# Patient Record
Sex: MALE | Race: WHITE
[De-identification: names, ages, dates, MRNs, and addresses within clinical notes are randomized per-mention and may not be internally consistent; named-entity substitution may affect disease eponyms.]

---

## 2022-05-10 LAB
BASOPHILS NFR BLD AUTO: 0.8 % (ref 0–5)
BUN SERPL-MCNC: 20 MG/DL (ref 7–18)
CHLORIDE SERPL-SCNC: 101 MMOL/L (ref 101–111)
CO2 SERPL-SCNC: 33 MMOL/L (ref 21–32)
CREAT SERPL-MCNC: 1 MG/DL (ref 0.5–1.5)
EOSINOPHIL NFR BLD AUTO: 4.3 % (ref 0–8)
ERYTHROCYTE [DISTWIDTH] IN BLOOD BY AUTOMATED COUNT: 13.9 % (ref 11–15.5)
GFR SERPL CREATININE-BSD FRML MDRD: 79 ML/MIN (ref 60–?)
GLUCOSE SERPL-MCNC: 102 MG/DL (ref 70–105)
HCT VFR BLD AUTO: 47.8 % (ref 42–54)
LYMPHOCYTES NFR SPEC AUTO: 16.2 % (ref 21–51)
MCH RBC QN AUTO: 28.2 PG (ref 27–33)
MCHC RBC AUTO-ENTMCNC: 31.6 G/DL (ref 32–36)
MCV RBC AUTO: 89.3 FL (ref 79–99)
MONOCYTES NFR BLD AUTO: 8.9 % (ref 3–13)
NEUTROPHILS NFR BLD AUTO: 69.4 % (ref 40–77)
NRBC BLD MANUAL-RTO: 0 % (ref 0–0.19)
PLATELET # BLD AUTO: 261 K/UL (ref 130–400)
POTASSIUM SERPL-SCNC: 5.3 MMOL/L (ref 3.5–5.1)
RBC # BLD AUTO: 5.35 MIL/UL (ref 4.5–6.2)
SODIUM SERPL-SCNC: 140 MMOL/L (ref 136–145)
WBC # BLD AUTO: 9.8 K/UL (ref 4.8–10.8)

## 2022-05-11 VITALS — DIASTOLIC BLOOD PRESSURE: 73 MMHG | SYSTOLIC BLOOD PRESSURE: 124 MMHG

## 2022-05-12 ENCOUNTER — HOSPITAL ENCOUNTER (OUTPATIENT)
Dept: HOSPITAL 90 - DAH | Age: 67
Discharge: HOME | End: 2022-05-12
Attending: ORTHOPAEDIC SURGERY
Payer: MEDICARE

## 2022-05-12 VITALS — DIASTOLIC BLOOD PRESSURE: 55 MMHG | SYSTOLIC BLOOD PRESSURE: 111 MMHG

## 2022-05-12 VITALS — DIASTOLIC BLOOD PRESSURE: 58 MMHG | SYSTOLIC BLOOD PRESSURE: 109 MMHG

## 2022-05-12 VITALS — WEIGHT: 202.6 LBS | HEIGHT: 72 IN | BODY MASS INDEX: 27.44 KG/M2

## 2022-05-12 VITALS — DIASTOLIC BLOOD PRESSURE: 60 MMHG | SYSTOLIC BLOOD PRESSURE: 129 MMHG

## 2022-05-12 VITALS — DIASTOLIC BLOOD PRESSURE: 68 MMHG | SYSTOLIC BLOOD PRESSURE: 127 MMHG

## 2022-05-12 VITALS — DIASTOLIC BLOOD PRESSURE: 62 MMHG | SYSTOLIC BLOOD PRESSURE: 118 MMHG

## 2022-05-12 VITALS — SYSTOLIC BLOOD PRESSURE: 112 MMHG | DIASTOLIC BLOOD PRESSURE: 57 MMHG

## 2022-05-12 VITALS — SYSTOLIC BLOOD PRESSURE: 136 MMHG | DIASTOLIC BLOOD PRESSURE: 62 MMHG

## 2022-05-12 VITALS — SYSTOLIC BLOOD PRESSURE: 122 MMHG | DIASTOLIC BLOOD PRESSURE: 63 MMHG

## 2022-05-12 VITALS — SYSTOLIC BLOOD PRESSURE: 124 MMHG | DIASTOLIC BLOOD PRESSURE: 67 MMHG

## 2022-05-12 VITALS — DIASTOLIC BLOOD PRESSURE: 69 MMHG | SYSTOLIC BLOOD PRESSURE: 128 MMHG

## 2022-05-12 VITALS — DIASTOLIC BLOOD PRESSURE: 62 MMHG | SYSTOLIC BLOOD PRESSURE: 120 MMHG

## 2022-05-12 VITALS — DIASTOLIC BLOOD PRESSURE: 81 MMHG | SYSTOLIC BLOOD PRESSURE: 136 MMHG

## 2022-05-12 VITALS — DIASTOLIC BLOOD PRESSURE: 64 MMHG | SYSTOLIC BLOOD PRESSURE: 118 MMHG

## 2022-05-12 DIAGNOSIS — M16.12: Primary | ICD-10-CM

## 2022-05-12 DIAGNOSIS — Z79.899: ICD-10-CM

## 2022-05-12 DIAGNOSIS — Z20.822: ICD-10-CM

## 2022-05-12 DIAGNOSIS — G89.29: ICD-10-CM

## 2022-05-12 PROCEDURE — 20610 DRAIN/INJ JOINT/BURSA W/O US: CPT

## 2022-05-12 PROCEDURE — 36415 COLL VENOUS BLD VENIPUNCTURE: CPT

## 2022-05-12 PROCEDURE — 87635 SARS-COV-2 COVID-19 AMP PRB: CPT

## 2022-05-12 PROCEDURE — 77002 NEEDLE LOCALIZATION BY XRAY: CPT

## 2022-05-12 PROCEDURE — 85025 COMPLETE CBC W/AUTO DIFF WBC: CPT

## 2022-05-12 PROCEDURE — 80048 BASIC METABOLIC PNL TOTAL CA: CPT

## 2022-05-12 PROCEDURE — 73503 X-RAY EXAM HIP UNI 4/> VIEWS: CPT

## 2022-05-12 RX ADMIN — SODIUM CHLORIDE SCH GM: 9 INJECTION, SOLUTION INTRAVENOUS at 08:50

## 2022-05-12 RX ADMIN — SODIUM CHLORIDE SCH GM: 9 INJECTION, SOLUTION INTRAVENOUS at 06:00

## 2022-08-18 VITALS — SYSTOLIC BLOOD PRESSURE: 140 MMHG | DIASTOLIC BLOOD PRESSURE: 75 MMHG

## 2022-08-18 LAB
ALBUMIN SERPL-MCNC: 4 G/DL (ref 3.5–5)
BASOPHILS NFR BLD AUTO: 1.5 % (ref 0–5)
BUN SERPL-MCNC: 19 MG/DL (ref 7–18)
CHLORIDE SERPL-SCNC: 100 MMOL/L (ref 101–111)
CO2 SERPL-SCNC: 31 MMOL/L (ref 21–32)
CREAT SERPL-MCNC: 1 MG/DL (ref 0.5–1.5)
CRP SERPL HS-MCNC: < 2 MG/L (ref 0–9)
EOSINOPHIL NFR BLD AUTO: 7.1 % (ref 0–8)
ERYTHROCYTE [DISTWIDTH] IN BLOOD BY AUTOMATED COUNT: 13.9 % (ref 11–15.5)
GFR SERPL CREATININE-BSD FRML MDRD: 79 ML/MIN (ref 60–?)
GLUCOSE SERPL-MCNC: 86 MG/DL (ref 70–105)
HCT VFR BLD AUTO: 44.4 % (ref 42–54)
LYMPHOCYTES NFR SPEC AUTO: 28.8 % (ref 21–51)
MCH RBC QN AUTO: 28.8 PG (ref 27–33)
MCHC RBC AUTO-ENTMCNC: 32.2 G/DL (ref 32–36)
MCV RBC AUTO: 89.5 FL (ref 79–99)
MONOCYTES NFR BLD AUTO: 10.5 % (ref 3–13)
NEUTROPHILS NFR BLD AUTO: 51.7 % (ref 40–77)
NRBC BLD MANUAL-RTO: 0 % (ref 0–0.19)
PLATELET # BLD AUTO: 253 K/UL (ref 130–400)
POTASSIUM SERPL-SCNC: 4.3 MMOL/L (ref 3.5–5.1)
RBC # BLD AUTO: 4.96 MIL/UL (ref 4.5–6.2)
SODIUM SERPL-SCNC: 136 MMOL/L (ref 136–145)
WBC # BLD AUTO: 5.4 K/UL (ref 4.8–10.8)

## 2022-08-19 ENCOUNTER — HOSPITAL ENCOUNTER (OUTPATIENT)
Dept: HOSPITAL 90 - DAH | Age: 67
End: 2022-08-19
Attending: ORTHOPAEDIC SURGERY
Payer: MEDICARE

## 2022-08-19 VITALS — DIASTOLIC BLOOD PRESSURE: 72 MMHG | SYSTOLIC BLOOD PRESSURE: 127 MMHG

## 2022-08-19 VITALS — SYSTOLIC BLOOD PRESSURE: 138 MMHG | DIASTOLIC BLOOD PRESSURE: 83 MMHG

## 2022-08-19 VITALS — DIASTOLIC BLOOD PRESSURE: 77 MMHG | SYSTOLIC BLOOD PRESSURE: 141 MMHG

## 2022-08-19 VITALS — DIASTOLIC BLOOD PRESSURE: 81 MMHG | SYSTOLIC BLOOD PRESSURE: 129 MMHG

## 2022-08-19 VITALS — WEIGHT: 207 LBS | BODY MASS INDEX: 28.04 KG/M2 | HEIGHT: 72 IN

## 2022-08-19 VITALS — DIASTOLIC BLOOD PRESSURE: 77 MMHG | SYSTOLIC BLOOD PRESSURE: 136 MMHG

## 2022-08-19 VITALS — SYSTOLIC BLOOD PRESSURE: 133 MMHG | DIASTOLIC BLOOD PRESSURE: 62 MMHG

## 2022-08-19 VITALS — SYSTOLIC BLOOD PRESSURE: 127 MMHG | DIASTOLIC BLOOD PRESSURE: 74 MMHG

## 2022-08-19 VITALS — DIASTOLIC BLOOD PRESSURE: 83 MMHG | SYSTOLIC BLOOD PRESSURE: 137 MMHG

## 2022-08-19 VITALS — SYSTOLIC BLOOD PRESSURE: 128 MMHG | DIASTOLIC BLOOD PRESSURE: 88 MMHG

## 2022-08-19 VITALS — DIASTOLIC BLOOD PRESSURE: 83 MMHG | SYSTOLIC BLOOD PRESSURE: 134 MMHG

## 2022-08-19 DIAGNOSIS — M16.12: Primary | ICD-10-CM

## 2022-08-19 DIAGNOSIS — Z20.822: ICD-10-CM

## 2022-08-19 DIAGNOSIS — G89.29: ICD-10-CM

## 2022-08-19 DIAGNOSIS — Z79.899: ICD-10-CM

## 2022-08-19 PROCEDURE — 87426 SARSCOV CORONAVIRUS AG IA: CPT

## 2022-08-19 PROCEDURE — 86140 C-REACTIVE PROTEIN: CPT

## 2022-08-19 PROCEDURE — 93005 ELECTROCARDIOGRAM TRACING: CPT

## 2022-08-19 PROCEDURE — 84134 ASSAY OF PREALBUMIN: CPT

## 2022-08-19 PROCEDURE — 36415 COLL VENOUS BLD VENIPUNCTURE: CPT

## 2022-08-19 PROCEDURE — 77002 NEEDLE LOCALIZATION BY XRAY: CPT

## 2022-08-19 PROCEDURE — 80048 BASIC METABOLIC PNL TOTAL CA: CPT

## 2022-08-19 PROCEDURE — 20610 DRAIN/INJ JOINT/BURSA W/O US: CPT

## 2022-08-19 PROCEDURE — 85025 COMPLETE CBC W/AUTO DIFF WBC: CPT

## 2022-08-19 PROCEDURE — 82040 ASSAY OF SERUM ALBUMIN: CPT

## 2022-08-19 PROCEDURE — 73503 X-RAY EXAM HIP UNI 4/> VIEWS: CPT

## 2025-01-02 ENCOUNTER — HOSPITAL ENCOUNTER (OUTPATIENT)
Dept: HOSPITAL 90 - DAHIP | Age: 70
Setting detail: OBSERVATION
LOS: 7 days | Discharge: HOME HEALTH | End: 2025-01-09
Attending: ORTHOPAEDIC SURGERY | Admitting: ORTHOPAEDIC SURGERY
Payer: MEDICARE

## 2025-01-02 VITALS — BODY MASS INDEX: 27.63 KG/M2 | WEIGHT: 204 LBS | HEIGHT: 72 IN

## 2025-01-02 VITALS
SYSTOLIC BLOOD PRESSURE: 164 MMHG | RESPIRATION RATE: 16 BRPM | TEMPERATURE: 97.2 F | HEART RATE: 60 BPM | DIASTOLIC BLOOD PRESSURE: 81 MMHG

## 2025-01-02 DIAGNOSIS — I10: ICD-10-CM

## 2025-01-02 DIAGNOSIS — Z79.899: ICD-10-CM

## 2025-01-02 DIAGNOSIS — D62: ICD-10-CM

## 2025-01-02 DIAGNOSIS — G89.18: ICD-10-CM

## 2025-01-02 DIAGNOSIS — M16.12: Primary | ICD-10-CM

## 2025-01-02 LAB
APTT PPP: 28.4 SEC (ref 26.3–35.5)
BASOPHILS # BLD AUTO: 0.09 K/UL (ref 0–0.2)
BASOPHILS NFR BLD AUTO: 1.5 % (ref 0–5)
BUN SERPL-MCNC: 11 MG/DL (ref 7–18)
CHLORIDE SERPL-SCNC: 104 MMOL/L (ref 101–111)
CO2 SERPL-SCNC: 33 MMOL/L (ref 21–32)
CREAT SERPL-MCNC: 1 MG/DL (ref 0.5–1.3)
EOSINOPHIL # BLD AUTO: 0.33 K/UL (ref 0–0.7)
EOSINOPHIL NFR BLD AUTO: 5.7 % (ref 0–8)
ERYTHROCYTE [DISTWIDTH] IN BLOOD BY AUTOMATED COUNT: 13.5 % (ref 11–15.5)
GFR SERPL CREATININE-BSD FRML MDRD: 81 ML/MIN (ref 90–?)
GLUCOSE SERPL-MCNC: 91 MG/DL (ref 70–105)
HCT VFR BLD AUTO: 46.2 % (ref 42–54)
IMM GRANULOCYTES # BLD: 0.01 K/UL (ref 0–1)
INR PPP: <= 0.93 (ref 0.85–1.15)
LYMPHOCYTES # SPEC AUTO: 1.6 K/UL (ref 1–4.8)
LYMPHOCYTES NFR SPEC AUTO: 27 % (ref 21–51)
MCH RBC QN AUTO: 29.4 PG (ref 27–33)
MCHC RBC AUTO-ENTMCNC: 32 G/DL (ref 32–36)
MCV RBC AUTO: 91.7 FL (ref 79–99)
MONOCYTES # BLD AUTO: 0.5 K/UL (ref 0.1–1)
MONOCYTES NFR BLD AUTO: 9 % (ref 3–13)
NEUTROPHILS # BLD AUTO: 3.3 K/UL (ref 1.8–7.7)
NEUTROPHILS NFR BLD AUTO: 56.6 % (ref 40–77)
NRBC BLD MANUAL-RTO: 0 % (ref 0–0.19)
PLATELET # BLD AUTO: 241 K/UL (ref 130–400)
POTASSIUM SERPL-SCNC: 5.1 MMOL/L (ref 3.5–5.1)
PROTHROMBIN TIME: 10.3 SEC (ref 9.6–11.6)
RBC # BLD AUTO: 5.04 MIL/UL (ref 4.5–6.2)
SODIUM SERPL-SCNC: 143 MMOL/L (ref 136–145)
WBC # BLD AUTO: 5.8 K/UL (ref 4.8–10.8)

## 2025-01-02 PROCEDURE — A4216 STERILE WATER/SALINE, 10 ML: HCPCS

## 2025-01-02 PROCEDURE — G0378 HOSPITAL OBSERVATION PER HR: HCPCS

## 2025-01-02 PROCEDURE — 64447 NJX AA&/STRD FEMORAL NRV IMG: CPT

## 2025-01-02 PROCEDURE — 88304 TISSUE EXAM BY PATHOLOGIST: CPT

## 2025-01-02 PROCEDURE — 85610 PROTHROMBIN TIME: CPT

## 2025-01-02 PROCEDURE — 97161 PT EVAL LOW COMPLEX 20 MIN: CPT

## 2025-01-02 PROCEDURE — A5120 SKIN BARRIER, WIPE OR SWAB: HCPCS

## 2025-01-02 PROCEDURE — 73503 X-RAY EXAM HIP UNI 4/> VIEWS: CPT

## 2025-01-02 PROCEDURE — A9272 DISP WOUND SUCT, DRSG/ACCESS: HCPCS

## 2025-01-02 PROCEDURE — C1776 JOINT DEVICE (IMPLANTABLE): HCPCS

## 2025-01-02 PROCEDURE — 97530 THERAPEUTIC ACTIVITIES: CPT

## 2025-01-02 PROCEDURE — 85730 THROMBOPLASTIN TIME PARTIAL: CPT

## 2025-01-02 PROCEDURE — A4215 STERILE NEEDLE: HCPCS

## 2025-01-02 PROCEDURE — 85027 COMPLETE CBC AUTOMATED: CPT

## 2025-01-02 PROCEDURE — 27130 TOTAL HIP ARTHROPLASTY: CPT

## 2025-01-02 PROCEDURE — G0379 DIRECT REFER HOSPITAL OBSERV: HCPCS

## 2025-01-02 PROCEDURE — 96376 TX/PRO/DX INJ SAME DRUG ADON: CPT

## 2025-01-02 PROCEDURE — A4221 SUPP NON-INSULIN INF CATH/WK: HCPCS

## 2025-01-02 PROCEDURE — A4223 INFUSION SUPPLIES W/O PUMP: HCPCS

## 2025-01-02 PROCEDURE — 85025 COMPLETE CBC W/AUTO DIFF WBC: CPT

## 2025-01-02 PROCEDURE — 88311 DECALCIFY TISSUE: CPT

## 2025-01-02 PROCEDURE — A4663 DIALYSIS BLOOD PRESSURE CUFF: HCPCS

## 2025-01-02 PROCEDURE — 97116 GAIT TRAINING THERAPY: CPT

## 2025-01-02 PROCEDURE — 96374 THER/PROPH/DIAG INJ IV PUSH: CPT

## 2025-01-02 PROCEDURE — A4600 SLEEVE, INTER LIMB COMP DEV: HCPCS

## 2025-01-02 PROCEDURE — 80048 BASIC METABOLIC PNL TOTAL CA: CPT

## 2025-01-02 PROCEDURE — 36415 COLL VENOUS BLD VENIPUNCTURE: CPT

## 2025-01-02 PROCEDURE — A4222 INFUSION SUPPLIES WITH PUMP: HCPCS

## 2025-01-02 PROCEDURE — 87641 MR-STAPH DNA AMP PROBE: CPT

## 2025-01-02 PROCEDURE — A4649 SURGICAL SUPPLIES: HCPCS

## 2025-01-02 PROCEDURE — 96375 TX/PRO/DX INJ NEW DRUG ADDON: CPT

## 2025-01-07 VITALS
DIASTOLIC BLOOD PRESSURE: 80 MMHG | RESPIRATION RATE: 18 BRPM | HEART RATE: 82 BPM | SYSTOLIC BLOOD PRESSURE: 139 MMHG | TEMPERATURE: 97.3 F

## 2025-01-07 VITALS
DIASTOLIC BLOOD PRESSURE: 66 MMHG | RESPIRATION RATE: 16 BRPM | HEART RATE: 63 BPM | SYSTOLIC BLOOD PRESSURE: 124 MMHG | TEMPERATURE: 97.3 F

## 2025-01-07 VITALS
SYSTOLIC BLOOD PRESSURE: 123 MMHG | HEART RATE: 60 BPM | DIASTOLIC BLOOD PRESSURE: 65 MMHG | TEMPERATURE: 97.3 F | RESPIRATION RATE: 15 BRPM

## 2025-01-07 VITALS
DIASTOLIC BLOOD PRESSURE: 72 MMHG | TEMPERATURE: 97.3 F | HEART RATE: 60 BPM | RESPIRATION RATE: 15 BRPM | SYSTOLIC BLOOD PRESSURE: 131 MMHG

## 2025-01-07 VITALS
DIASTOLIC BLOOD PRESSURE: 76 MMHG | TEMPERATURE: 97.3 F | SYSTOLIC BLOOD PRESSURE: 130 MMHG | RESPIRATION RATE: 17 BRPM | HEART RATE: 83 BPM

## 2025-01-07 VITALS
TEMPERATURE: 97.3 F | SYSTOLIC BLOOD PRESSURE: 125 MMHG | HEART RATE: 63 BPM | RESPIRATION RATE: 15 BRPM | DIASTOLIC BLOOD PRESSURE: 62 MMHG

## 2025-01-07 VITALS
HEART RATE: 78 BPM | TEMPERATURE: 97.8 F | RESPIRATION RATE: 18 BRPM | SYSTOLIC BLOOD PRESSURE: 121 MMHG | DIASTOLIC BLOOD PRESSURE: 71 MMHG

## 2025-01-07 VITALS
DIASTOLIC BLOOD PRESSURE: 82 MMHG | RESPIRATION RATE: 17 BRPM | SYSTOLIC BLOOD PRESSURE: 128 MMHG | TEMPERATURE: 97.3 F | HEART RATE: 87 BPM

## 2025-01-07 VITALS
TEMPERATURE: 97.3 F | DIASTOLIC BLOOD PRESSURE: 69 MMHG | RESPIRATION RATE: 16 BRPM | HEART RATE: 69 BPM | SYSTOLIC BLOOD PRESSURE: 130 MMHG

## 2025-01-07 VITALS
DIASTOLIC BLOOD PRESSURE: 63 MMHG | HEART RATE: 62 BPM | TEMPERATURE: 97.3 F | RESPIRATION RATE: 15 BRPM | SYSTOLIC BLOOD PRESSURE: 125 MMHG

## 2025-01-07 VITALS — DIASTOLIC BLOOD PRESSURE: 74 MMHG | HEART RATE: 86 BPM | RESPIRATION RATE: 18 BRPM | SYSTOLIC BLOOD PRESSURE: 134 MMHG

## 2025-01-07 VITALS
SYSTOLIC BLOOD PRESSURE: 127 MMHG | RESPIRATION RATE: 18 BRPM | DIASTOLIC BLOOD PRESSURE: 67 MMHG | OXYGEN SATURATION: 95 % | HEART RATE: 83 BPM

## 2025-01-07 VITALS — RESPIRATION RATE: 18 BRPM | DIASTOLIC BLOOD PRESSURE: 67 MMHG | SYSTOLIC BLOOD PRESSURE: 128 MMHG | HEART RATE: 63 BPM

## 2025-01-07 VITALS — RESPIRATION RATE: 19 BRPM | DIASTOLIC BLOOD PRESSURE: 51 MMHG | SYSTOLIC BLOOD PRESSURE: 120 MMHG | HEART RATE: 82 BPM

## 2025-01-07 VITALS — RESPIRATION RATE: 19 BRPM | DIASTOLIC BLOOD PRESSURE: 61 MMHG | HEART RATE: 67 BPM | SYSTOLIC BLOOD PRESSURE: 125 MMHG

## 2025-01-07 VITALS
DIASTOLIC BLOOD PRESSURE: 67 MMHG | HEART RATE: 65 BPM | RESPIRATION RATE: 16 BRPM | SYSTOLIC BLOOD PRESSURE: 122 MMHG | TEMPERATURE: 97.3 F

## 2025-01-07 VITALS
SYSTOLIC BLOOD PRESSURE: 129 MMHG | DIASTOLIC BLOOD PRESSURE: 65 MMHG | RESPIRATION RATE: 19 BRPM | TEMPERATURE: 98.4 F | HEART RATE: 60 BPM

## 2025-01-07 VITALS
TEMPERATURE: 97.3 F | DIASTOLIC BLOOD PRESSURE: 63 MMHG | RESPIRATION RATE: 15 BRPM | HEART RATE: 61 BPM | SYSTOLIC BLOOD PRESSURE: 131 MMHG

## 2025-01-07 VITALS
HEART RATE: 79 BPM | TEMPERATURE: 97.3 F | SYSTOLIC BLOOD PRESSURE: 124 MMHG | DIASTOLIC BLOOD PRESSURE: 74 MMHG | RESPIRATION RATE: 15 BRPM

## 2025-01-07 VITALS — HEART RATE: 91 BPM | DIASTOLIC BLOOD PRESSURE: 70 MMHG | RESPIRATION RATE: 18 BRPM | SYSTOLIC BLOOD PRESSURE: 127 MMHG

## 2025-01-07 VITALS
HEART RATE: 62 BPM | SYSTOLIC BLOOD PRESSURE: 135 MMHG | TEMPERATURE: 96.4 F | DIASTOLIC BLOOD PRESSURE: 76 MMHG | RESPIRATION RATE: 18 BRPM

## 2025-01-07 VITALS
HEART RATE: 67 BPM | TEMPERATURE: 98 F | DIASTOLIC BLOOD PRESSURE: 58 MMHG | RESPIRATION RATE: 18 BRPM | SYSTOLIC BLOOD PRESSURE: 116 MMHG

## 2025-01-07 VITALS
RESPIRATION RATE: 17 BRPM | TEMPERATURE: 97.3 F | SYSTOLIC BLOOD PRESSURE: 129 MMHG | DIASTOLIC BLOOD PRESSURE: 75 MMHG | HEART RATE: 75 BPM

## 2025-01-07 VITALS — RESPIRATION RATE: 18 BRPM | DIASTOLIC BLOOD PRESSURE: 64 MMHG | SYSTOLIC BLOOD PRESSURE: 113 MMHG | HEART RATE: 74 BPM

## 2025-01-07 VITALS — RESPIRATION RATE: 18 BRPM | HEART RATE: 65 BPM | DIASTOLIC BLOOD PRESSURE: 37 MMHG | SYSTOLIC BLOOD PRESSURE: 71 MMHG

## 2025-01-07 RX ADMIN — TRANEXAMIC ACID ONE MG: 100 INJECTION, SOLUTION INTRAVENOUS at 16:36

## 2025-01-07 RX ADMIN — ASPIRIN TAB DELAYED RELEASE 81 MG SCH MG: 81 TABLET DELAYED RESPONSE at 20:21

## 2025-01-07 RX ADMIN — FAMOTIDINE SCH MG: 20 TABLET, FILM COATED ORAL at 20:21

## 2025-01-07 RX ADMIN — SODIUM CHLORIDE SCH MG: 9 INJECTION, SOLUTION INTRAVENOUS at 12:00

## 2025-01-07 RX ADMIN — MEPERIDINE HYDROCHLORIDE ONE MG: 25 INJECTION, SOLUTION INTRAMUSCULAR; INTRAVENOUS; SUBCUTANEOUS at 16:44

## 2025-01-07 RX ADMIN — Medication ONE MLS/HR: at 10:03

## 2025-01-07 RX ADMIN — HYDROCODONE BITARTRATE AND ACETAMINOPHEN PRN TAB: 5; 325 TABLET ORAL at 20:50

## 2025-01-07 RX ADMIN — TRANEXAMIC ACID ONE MG: 100 INJECTION, SOLUTION INTRAVENOUS at 12:45

## 2025-01-07 RX ADMIN — SODIUM CHLORIDE SCH MLS/HR: 0.9 INJECTION, SOLUTION INTRAVENOUS at 16:00

## 2025-01-07 NOTE — OP
Operative Note:


DATE OF PROCEDURE: 1/7/25 





SURGEON: DEEDEE CHILDRESS MD 





ASSISTANT: [Yana Herr CFA] 





ANESTHESIA:  [General anesthesia plus regional block]





ANESTHESIOLOGIST/CRNA:  [Micheal Clark CRNA]  





PREOPERATIVE DIAGNOSIS:  [Left hip osteoarthritis]





POSTOPERATIVE DIAGNOSIS:  [Left hip osteoarthritis]





IMPLANTS:  [BIOMET.  Femoral stem taper lock standard offset size 12 mm.  

Femoral head size 36 mm plus three.  Acetabulum G7 size 60 mm.  Acetabular liner

 36 mm high wall.]





PROCEDURE: [Left total hip replacement]





ESTIMATED BLOOD LOSS: [300 mL]





INDICATIONS: [The patient is a very healthy 69-year-old male with history of 

pain to the left hip secondary to osteoarthritis.  The patient is extremely 

active and wishes to proceed with a total hip arthroplasty after failing 

conservative treatment.  The patient is brought to the operating room for a left

 total hip arthroplasty, procedure that he understood as well as the risks 

involved, benefits and possible complications and agreed signed the consent 

form]








DESCRIPTION OF PROCEDURE: [After adequate general anesthesia was achieved and 

regional block obtained the patient was placed in the    lateral decubitus with 

the use of the beanbag and hip holders.  The left lower extremity was prepped 

and draped in the usual manner after x-rays taken with the C-arm were used to 

check the position of the pelvis.  After anatomic landmarks were identified we 

proceeded to make an incision in the skin centered on the greater trochanter and

 with a slight curve posteriorly followed by dissection of the subcutaneous 

tissue with the use of the Bovie cautery.  The tensor fascia anna and gluteus 

zachariah fascia were then opened longitudinally and the fibers of the gluteus 

muscle were split manually entering into the deep space applying then the 

Charnley retractor.  At this point we proceeded to apply a bone infusion needle 

into the greater trochanter area and we injected 500 mg of vancomycin in 50 mL 

of normal saline into the proximal femur.  The bone needle was then removed.  

The posterior border of the gluteus medius was identified and elevated and a 

curved Hohmann retractor was inserted underneath to be able to expose the 

gluteus minimus and short external rotators.  The interval between the 

piriformis and gluteus minimus was open with the use of the Bovie cautery and 

then the rotators and capsule were detached from their femoral neck insertion 

and retracted posteriorly entering into the hip joint.  At this point a marker 

was applied in the iliac bone just superior to the acetabulum with the use of 

drill guide to be able to check the offset and the length of the extremity at 

the level of the trochanter.  Once the marker was made this was passed to the 

back table.  We then proceeded to dislocate the hip by flexing the hip and then 

internally rotating it and after retractors were applied to the base of the 

femoral neck we proceeded to use the oscillating saw to cut the neck at this 

level.  We then proceeded to use a box osteotome to enter the canal followed by 

insertion of the canal finder and then we proceeded to broach from size 4 up to 

above-mentioned size.  At this point we removed the last broach and packed the 

canal for hemostasis and after the retractors were removed we proceeded then to 

bring the hip into extension.  Hohmann retractors were then applied anteriorly 

and posteriorly to the acetabulum removing then the labrum and foveal tissue.  

We proceeded  to ream the acetabulum medializing it obtaining adequate cortico-

cancellous bone.  After irrigation was of the acetabulum was completed we 

proceeded then to apply the final component and x-rays were taken correcting the

 orientation of the acetabulum to its final position.  Then a trial liner was 

inserted as well as a trial femur and then we proceeded to apply the trial 

femoral heads reducing the hip and taken x-rays until we identify the adequate 

size component using the marker to check the length and offset as well as 

clinically using the shuck test and measuring the leg length.  Once we were 

satisfied we proceeded to remove the components from the femur and the trial 

liner after dislocating the hip and after copious irrigation of the joint was 

completed we then proceeded to apply the final liner followed by the insertion 

of the final femoral stem femoral head.  Once the hip was reduced we used a 

marker once again and the leg length and offset were normal and clinically the 

patient had normal length, negative shuck test and x-rays reveal adequate leg 

length compared to the opposite hip.  The joint was then copiously irrigated 

with a diluted Betadine solution followed by irrigation with antibiotic solution

 with jet lavage once again and we then proceeded to take final x-rays and then 

to close the wound first with approximation of the capsule with #1 Vicryl simple

 stitches followed by approximation of the short  external rotators with #2 PDS 

suture passing the sutures through the bone.  The Charnley retractor was then 

removed and the gluteus zachariah fascia was closed with a #1 Vicryl running 

stitch and the tensor fascia anna with #1 Vicryl crossed stitches.  The 

subcutaneous tissue was closed with #2 Monocryl inverted stitches and the skin 

was closed with 3-0 Monocryl subcuticularly.  A suction dressing was then 

applied to the incision and the drapes were then removed the patient being 

placed in the supine position.  Leg length was checked and noted to be adequate.

  The patient was then transferred to a hospital bed and taken to recovery room 

for follow-up by anesthesia.  There were no complications during the procedure.]











DEEDEE CHILDRESS MD                 Jan 7, 2025 19:37

## 2025-01-07 NOTE — HMCIMG
HIP UNILAT 4VW LEFT



REASON:  ORIF LEFT THEODORE, SX.



COMPARISON: None 



TECHNIQUE: Fluoroscopic images of left hip were obtained. 



FINDINGS: Please see procedure report by referring physician.



IMPRESSION: 

  Intraoperative films.

## 2025-01-07 NOTE — NUR
PATIENT ADMITTED TO UNIT. AWAKE AND ALERT. NO S/S OF DISTRESS NOTED. SX DRESSING IN PLACE 
AND INTACT. SCD'S APPLIED AS ORDERED POST OP VITALS INITIATED. PATIENT EDUCATED ON I.S.

## 2025-01-08 VITALS
TEMPERATURE: 97.6 F | HEART RATE: 65 BPM | DIASTOLIC BLOOD PRESSURE: 65 MMHG | SYSTOLIC BLOOD PRESSURE: 118 MMHG | RESPIRATION RATE: 20 BRPM

## 2025-01-08 VITALS
SYSTOLIC BLOOD PRESSURE: 112 MMHG | HEART RATE: 69 BPM | DIASTOLIC BLOOD PRESSURE: 68 MMHG | TEMPERATURE: 98 F | RESPIRATION RATE: 18 BRPM

## 2025-01-08 VITALS
HEART RATE: 73 BPM | TEMPERATURE: 97.8 F | SYSTOLIC BLOOD PRESSURE: 124 MMHG | DIASTOLIC BLOOD PRESSURE: 64 MMHG | RESPIRATION RATE: 17 BRPM

## 2025-01-08 VITALS
DIASTOLIC BLOOD PRESSURE: 57 MMHG | SYSTOLIC BLOOD PRESSURE: 98 MMHG | TEMPERATURE: 97.9 F | HEART RATE: 65 BPM | RESPIRATION RATE: 20 BRPM

## 2025-01-08 VITALS
TEMPERATURE: 98.2 F | SYSTOLIC BLOOD PRESSURE: 127 MMHG | RESPIRATION RATE: 20 BRPM | HEART RATE: 66 BPM | DIASTOLIC BLOOD PRESSURE: 49 MMHG

## 2025-01-08 VITALS — OXYGEN SATURATION: 100 %

## 2025-01-08 LAB
BUN SERPL-MCNC: 12 MG/DL (ref 7–18)
CHLORIDE SERPL-SCNC: 104 MMOL/L (ref 101–111)
CO2 SERPL-SCNC: 30 MMOL/L (ref 21–32)
CREAT SERPL-MCNC: 1 MG/DL (ref 0.5–1.3)
ERYTHROCYTE [DISTWIDTH] IN BLOOD BY AUTOMATED COUNT: 13.7 % (ref 11–15.5)
GFR SERPL CREATININE-BSD FRML MDRD: 81 ML/MIN (ref 90–?)
GLUCOSE SERPL-MCNC: 132 MG/DL (ref 70–105)
HCT VFR BLD AUTO: 37.5 % (ref 42–54)
MCH RBC QN AUTO: 29.4 PG (ref 27–33)
MCHC RBC AUTO-ENTMCNC: 32.5 G/DL (ref 32–36)
MCV RBC AUTO: 90.4 FL (ref 79–99)
NRBC BLD MANUAL-RTO: 0 % (ref 0–0.19)
PLATELET # BLD AUTO: 221 K/UL (ref 130–400)
POTASSIUM SERPL-SCNC: 4.7 MMOL/L (ref 3.5–5.1)
RBC # BLD AUTO: 4.15 MIL/UL (ref 4.5–6.2)
SODIUM SERPL-SCNC: 139 MMOL/L (ref 136–145)
WBC # BLD AUTO: 11 K/UL (ref 4.8–10.8)

## 2025-01-08 NOTE — PN
Ortho postop day one.  


This morning patient is awake alert and oriented he is seated at the bedside in 

the chair resting comfortably reporting adequate pain control.  


Vital signs have remained stable.  He has been afebrile.  


Laboratory results reviewed.  Noted to have a drop in hemoglobin and hematocrit 

as expected after total hip arthroplasty.  Patient is asymptomatic and we will 

address per protocol as necessary.  


Voiding on his own without difficulty.  


Operative findings discussed with the patient.  


Instructed on an incentive spirometry and returned demonstration adequate.  


Dressing is intact.  No obvious leg-length discrepancy.  The gastrocnemius a 

soft nontender.  Distal neurovascular exam normal.  


Patient did not ambulate yesterday but is pending ambulation this morning.  


Patient is anticipating going home with a Home Health.  I have discussed with 

the patient we will depend on his progress with physical therapy today and he 

agrees.


Assessment: Status post left total hip arthroplasty.  


                  Asymptomatic acute postoperative blood loss anemia.  


Plan:  Continue with Dr. Israel was total hip arthroplasty protocol and discharge 

planning.  


         Asymptomatic acute postoperative blood loss anemia addressed with the 

protocol as necessary


Vitals/Labs





Vital Signs








  Date Time  Temp Pulse Resp B/P (MAP) Pulse Ox O2 Delivery O2 Flow Rate FiO2


 


1/8/25 08:00     100 Nasal Cannula* 2 28


 


1/8/25 07:45 97.9 65 20 98/57    





Laboratory Tests


1/8/25 04:36








Medications





Current Medications


Cefazolin Sodium 2 gm STK-MED ONCE .ROUTE Last administered on 1/7/25at 12:40;  

Start 1/7/25 at 09:43;  Stop 1/7/25 at 09:43;  Status DC


Lactated Ringer's 1,000 ml @  As Directed STK-MED ONCE IV Last administered on 

1/7/25at 10:03;  Start 1/7/25 at 09:43;  Stop 1/7/25 at 09:43;  Status DC


Vancomycin HCl 500 mg ONCALL IJ Last administered on 1/7/25at 13:35;  Start 

1/7/25 at 12:00;  Stop 1/7/25 at 16:00;  Status DC


Cefazolin Sodium 1 gm STK-MED ONCE .ROUTE Last administered on 1/7/25at 13:35;  

Start 1/7/25 at 11:48;  Stop 1/7/25 at 11:48;  Status DC


Tranexamic Acid 1,000 mg STK-MED ONCE .ROUTE Last administered on 1/7/25at 

12:45;  Start 1/7/25 at 11:49;  Stop 1/7/25 at 11:50;  Status DC


Lidocaine HCl 5 ml STK-MED ONCE IJ;  Start 1/7/25 at 12:11;  Stop 1/7/25 at 

12:12;  Status DC


Midazolam HCl 2 mg STK-MED ONCE .ROUTE;  Start 1/7/25 at 12:12;  Stop 1/7/25 at 

12:12;  Status DC


Propofol 200 mg STK-MED ONCE IV;  Start 1/7/25 at 12:12;  Stop 1/7/25 at 12:12; 

Status DC


Rocuronium Bromide 50 mg STK-MED ONCE .ROUTE;  Start 1/7/25 at 12:12;  Stop 

1/7/25 at 12:12;  Status DC


Fentanyl Citrate 100 mcg STK-MED ONCE .ROUTE;  Start 1/7/25 at 12:12;  Stop 

1/7/25 at 12:13;  Status DC


Dexamethasone Sodium Phosphate 4 mg STK-MED ONCE .ROUTE;  Start 1/7/25 at 12:14;

 Stop 1/7/25 at 12:14;  Status DC


Glycopyrrolate 1 mg STK-MED ONCE .ROUTE;  Start 1/7/25 at 12:14;  Stop 1/7/25 at

12:14;  Status DC


Neostigmine Methylsulfate 10 mg STK-MED ONCE IV;  Start 1/7/25 at 12:14;  Stop 

1/7/25 at 12:14;  Status DC


Phenylephrine HCl 10 mg STK-MED ONCE IV;  Start 1/7/25 at 12:14;  Stop 1/7/25 at

12:15;  Status DC


Ondansetron HCl 4 mg STK-MED ONCE .ROUTE;  Start 1/7/25 at 12:15;  Stop 1/7/25 

at 12:15;  Status DC


Ropivacaine 150 mg STK-MED ONCE .ROUTE;  Start 1/7/25 at 12:16;  Stop 1/7/25 at 

12:16;  Status DC


Ondansetron HCl 4 mg STK-MED ONCE .ROUTE;  Start 1/7/25 at 13:20;  Stop 1/7/25 

at 13:24;  Status DC


Ephedrine Sulfate 50 mg STK-MED ONCE .ROUTE;  Start 1/7/25 at 13:26;  Stop 

1/7/25 at 13:26;  Status DC


Fentanyl Citrate 100 mcg STK-MED ONCE .ROUTE;  Start 1/7/25 at 13:35;  Stop 

1/7/25 at 13:35;  Status DC


Sodium Chloride 1,000 ml @  100 mls/hr Q10H IV Last administered on 1/8/25at 

04:21;  Start 1/7/25 at 16:00;  Stop 1/8/25 at 15:59


Polyethylene Glycol 17 gm DAILY PO;  Start 1/8/25 at 09:00;  Stop 2/7/25 at 

08:59


Bisacodyl 10 mg DAILY  PRN RC;  Start 1/10/25 at 16:00;  Stop 2/9/25 at 15:59


Ketorolac Tromethamine 15 mg Q6H  PRN IV Last administered on 1/8/25at 08:41;  

Start 1/7/25 at 16:00;  Stop 1/12/25 at 15:59


Famotidine 20 mg BID PO Last administered on 1/8/25at 08:29;  Start 1/7/25 at 

21:00;  Stop 2/6/25 at 20:59


Ferrous Fumarate 324 mg DAILY  PRN PO;  Start 1/7/25 at 16:00;  Stop 2/6/25 at 

15:59


Temazepam 15 mg HS  PRN PO;  Start 1/7/25 at 16:00;  Stop 2/6/25 at 15:59


Calcium Carbonate 500 mg Q12H  PRN PO;  Start 1/7/25 at 16:00;  Stop 2/6/25 at 

15:59


Diphenhydramine HCl 25 mg Q6H  PRN IVP;  Start 1/7/25 at 16:00;  Stop 2/6/25 at 

15:59


Ondansetron HCl 4 mg Q6H  PRN IVP;  Start 1/7/25 at 16:00;  Stop 2/6/25 at 15:59


Cefazolin Sodium 2 gm Q8H IVP Last administered on 1/8/25at 05:53;  Start 1/7/25

at 21:00;  Stop 1/8/25 at 05:01;  Status DC


Potassium Chloride 100 ml @  100 mls/hr AD  PRN IV;  Start 1/7/25 at 16:00;  

Stop 2/6/25 at 15:59


Potassium Chloride 20 meq AD  PRN PO;  Start 1/7/25 at 16:00;  Stop 2/6/25 at 

15:59


Potassium Chloride 20 meq AD  PRN PO;  Start 1/7/25 at 16:00;  Stop 2/6/25 at 

15:59


Celecoxib 200 mg BID PO Last administered on 1/8/25at 08:29;  Start 1/7/25 at 

21:00;  Stop 2/6/25 at 20:59


Tramadol HCl 50 mg Q6H  PRN PO;  Start 1/7/25 at 16:00;  Stop 1/12/25 at 15:59


Acetaminophen/ Hydrocodone Bitart  Q4H  PRN PO Last administered on 1/8/25at 

05:54;  Start 1/7/25 at 16:00;  Stop 1/12/25 at 15:59


Aspirin 81 mg BID PO Last administered on 1/8/25at 08:29;  Start 1/7/25 at 

21:00;  Stop 2/6/25 at 20:59


Tranexamic Acid 1,000 mg STK-MED ONCE .ROUTE Last administered on 1/7/25at 

16:36;  Start 1/7/25 at 16:32;  Stop 1/7/25 at 16:32;  Status DC


Meperidine HCl 25 mg STK-MED ONCE .ROUTE Last administered on 1/7/25at 16:44;  

Start 1/7/25 at 16:41;  Stop 1/7/25 at 16:41;  Status DC











JAC ANDRADE NP           Jan 8, 2025 09:43

## 2025-01-09 VITALS
OXYGEN SATURATION: 98 % | HEART RATE: 69 BPM | DIASTOLIC BLOOD PRESSURE: 61 MMHG | SYSTOLIC BLOOD PRESSURE: 115 MMHG | RESPIRATION RATE: 18 BRPM | TEMPERATURE: 98.7 F

## 2025-01-09 VITALS
TEMPERATURE: 97.8 F | SYSTOLIC BLOOD PRESSURE: 119 MMHG | RESPIRATION RATE: 20 BRPM | HEART RATE: 62 BPM | DIASTOLIC BLOOD PRESSURE: 65 MMHG

## 2025-01-09 VITALS
TEMPERATURE: 98.4 F | DIASTOLIC BLOOD PRESSURE: 62 MMHG | HEART RATE: 74 BPM | SYSTOLIC BLOOD PRESSURE: 121 MMHG | RESPIRATION RATE: 18 BRPM

## 2025-01-09 VITALS
SYSTOLIC BLOOD PRESSURE: 118 MMHG | HEART RATE: 59 BPM | DIASTOLIC BLOOD PRESSURE: 61 MMHG | RESPIRATION RATE: 20 BRPM | TEMPERATURE: 98.1 F

## 2025-01-09 NOTE — DS
DISCHARGE SUMMARY








[Date of admission:  01/07/2025





Date of discharge:  01/09/2025





Final diagnosis:  Left hip osteoarthritis





Surgical procedures:  Left total hip arthroplasty on 01/07/2025





Summary of History and Physical: The patient is a  69 year-old male with history

 of severe arthrosis to the left hip that has been present for several years and

 has been treated conservatively with no longer adequate response to treatment. 

 The patient is being admitted for total hip arthroplasty.


Previous medical history:  Hypertension


Previous surgical history:  Please see H&P and admission


Family history:  Not relevant


Social history: Negative for use of tobacco or alcohol.   


Allergies: NKDA.


Review of system: Negative on admission





Hospital course: The patient was admitted and taken to the operating room for a 

total hip arthroplasty, procedure that went uneventful.  Postoperatively the 

patient remained hemodynamically stable and afebrile.  The patient received 

antibiotic and anticoagulation prophylaxis as per protocol.  The patient was 

evaluated by physical therapy and started rehabilitation treatment with 

ambulation with the use of walker, weightbearing as tolerated, hip precautions, 

range of motion exercises and bed transfers.  The patient was also evaluated by 

case management and arrangements were made for discharge .  The patient 

tolerated diet well.  On postop day #2 all the arrangements were completed.  The

 dressing was changed and the wound was noted to be stable and the patient was 

dismissed     .





Condition on discharge: Good





Disposition: The patient will be dismissed home with home health.  Follow-up 

will be done at the office in 3 weeks.  The patient is to continue with physical

 therapy and rehabilitation at home and be ambulatory with the use of a walker, 

weightbearing as tolerated and continue with hip precautions.  Continue taking 

pain medication as instructed as well as anticoagulation prophylaxis.  Continue 

with home medications also as instructed and continue with pre admission diet.]











DEEDEE CHILDRESS MD]











DEEDEE CHILDRESS MD                 Jan 9, 2025 08:11

## 2025-01-09 NOTE — NUR
discharged



printed discharge paperwork given to patient, educated patient and family regarding diet, 
activity, medication, follow up visits, and signs and symptoms to report/return to ER for. 
Answered pt questions, patient and family understood. 



Gave report to home health nurse, SAGE Perez with [facility].